# Patient Record
Sex: MALE | Race: WHITE | URBAN - METROPOLITAN AREA
[De-identification: names, ages, dates, MRNs, and addresses within clinical notes are randomized per-mention and may not be internally consistent; named-entity substitution may affect disease eponyms.]

---

## 2023-01-15 ENCOUNTER — APPOINTMENT (OUTPATIENT)
Dept: CT IMAGING | Age: 23
End: 2023-01-15
Payer: COMMERCIAL

## 2023-01-15 ENCOUNTER — HOSPITAL ENCOUNTER (EMERGENCY)
Age: 23
Discharge: HOME | End: 2023-01-15
Attending: EMERGENCY MEDICINE
Payer: COMMERCIAL

## 2023-01-15 VITALS
DIASTOLIC BLOOD PRESSURE: 61 MMHG | SYSTOLIC BLOOD PRESSURE: 127 MMHG | RESPIRATION RATE: 16 BRPM | OXYGEN SATURATION: 98 % | BODY MASS INDEX: 25.05 KG/M2 | HEIGHT: 70 IN | TEMPERATURE: 98.7 F | HEART RATE: 99 BPM | WEIGHT: 175 LBS

## 2023-01-15 DIAGNOSIS — S02.2XXA CLOSED FRACTURE OF NASAL BONE, INITIAL ENCOUNTER: Primary | ICD-10-CM

## 2023-01-15 DIAGNOSIS — S01.21XA LACERATION OF NOSE, INITIAL ENCOUNTER: ICD-10-CM

## 2023-01-15 PROCEDURE — 90714 TD VACC NO PRESV 7 YRS+ IM: CPT | Performed by: STUDENT IN AN ORGANIZED HEALTH CARE EDUCATION/TRAINING PROGRAM

## 2023-01-15 PROCEDURE — 6360000002 HC RX W HCPCS: Performed by: STUDENT IN AN ORGANIZED HEALTH CARE EDUCATION/TRAINING PROGRAM

## 2023-01-15 PROCEDURE — 70486 CT MAXILLOFACIAL W/O DYE: CPT

## 2023-01-15 PROCEDURE — 70450 CT HEAD/BRAIN W/O DYE: CPT

## 2023-01-15 PROCEDURE — 99284 EMERGENCY DEPT VISIT MOD MDM: CPT

## 2023-01-15 PROCEDURE — 90471 IMMUNIZATION ADMIN: CPT | Performed by: STUDENT IN AN ORGANIZED HEALTH CARE EDUCATION/TRAINING PROGRAM

## 2023-01-15 RX ORDER — KETOROLAC TROMETHAMINE 30 MG/ML
15 INJECTION, SOLUTION INTRAMUSCULAR; INTRAVENOUS ONCE
Status: DISCONTINUED | OUTPATIENT
Start: 2023-01-15 | End: 2023-01-15 | Stop reason: HOSPADM

## 2023-01-15 RX ADMIN — CLOSTRIDIUM TETANI TOXOID ANTIGEN (FORMALDEHYDE INACTIVATED) AND CORYNEBACTERIUM DIPHTHERIAE TOXOID ANTIGEN (FORMALDEHYDE INACTIVATED) 0.5 ML: 5; 2 INJECTION, SUSPENSION INTRAMUSCULAR at 05:48

## 2023-01-15 ASSESSMENT — PAIN SCALES - GENERAL: PAINLEVEL_OUTOF10: 0

## 2023-01-15 ASSESSMENT — PAIN - FUNCTIONAL ASSESSMENT: PAIN_FUNCTIONAL_ASSESSMENT: NONE - DENIES PAIN

## 2023-01-15 NOTE — DISCHARGE INSTRUCTIONS
Return if any new or worsening symptoms. Return if any chest pain or shortness of breath. Follow-up with your primary care provider. Do not blow your nose until evaluated by ENT    CT HEAD WO CONTRAST   Final Result   No acute intracranial abnormality. Bilateral nasal bone fractures. Small air-fluid levels within the bilateral maxillary sinuses. No definite   displaced fracture identified. CT FACIAL BONES WO CONTRAST   Final Result   No acute intracranial abnormality. Bilateral nasal bone fractures. Small air-fluid levels within the bilateral maxillary sinuses. No definite   displaced fracture identified.

## 2023-01-15 NOTE — ED PROVIDER NOTES
[address]    Pt Name: Yesenia Dia  MRN: [id number]  [place]gf[address]honatan 2000  Date of evaluation: 1/15/2023  Provider: Allison Cooney MD  PCP: No primary care provider on file. Note Started: 4:28 AM EST 1/15/23    HPI     Patient is a 23 y.o. male presents with a chief complaint of   Chief Complaint   Patient presents with    Laceration     Pt to ED stating he was rough housing with friends and smacked his face on the floor. +laceration to bridge of nose, bleeding controlled. Pt A&Ox4, -LOC. .    Presents after having a laceration on his nose. Patient reportedly was wrestling with a friend. Patient was drinking alcohol today. Patient hit his face on the ground. Patient had some nosebleeding. Did not lose consciousness. Patient does not take any blood thinners. Denies any other injuries. Unsure of his last tetanus. Nursing Notes were all reviewed and agreed with or any disagreements were addressed in the HPI. History From: patient and patient's roommate    Review of Systems   Positives and Pertinent negatives as per HPI. Physical Exam  Vitals and nursing note reviewed. Constitutional:       Appearance: He is well-developed. HENT:      Head: Normocephalic and atraumatic. Eyes:      Conjunctiva/sclera: Conjunctivae normal.   Cardiovascular:      Rate and Rhythm: Normal rate and regular rhythm. Heart sounds: Normal heart sounds. No murmur heard. Pulmonary:      Effort: Pulmonary effort is normal. No respiratory distress. Breath sounds: Normal breath sounds. No wheezing or rales. Abdominal:      General: Bowel sounds are normal.      Palpations: Abdomen is soft. Tenderness: There is no abdominal tenderness. There is no guarding or rebound. Musculoskeletal:         General: No tenderness or deformity. Cervical back: Normal range of motion and neck supple.    Skin:     General: Skin is warm and dry. Comments: Small laceration over the bridge of the nose. Neurological:      Mental Status: He is alert and oriented to person, place, and time. Cranial Nerves: No cranial nerve deficit. Coordination: Coordination normal.        Procedures       MDM           Patient is a 23 y.o. male presenting with facial laceration. Patient had a CT of the head and nose. Patient has no cervical spine tenderness. Patient is neurovascularly intact. No neurodeficits. Patient was drinking alcohol today. Patient had a CT of the head facial bones. Patient has no nasal septal hematoma. No active bleeding. Patient was given a tetanus shot. Patient's laceration was glued shut. Patient clinically appears well. Will be discharged home. Patient does drink alcohol. Patient has a history of a shunt. Shunt looks in place. Patient did have bilateral nasal fractures. Patient was educated not to blow his nose. Patient will follow-up with ear nose and throat. Differential includes but is not limited to laceration, nasal fracture, nasal septal hematoma, intracranial hemorrhage. Patient was given return precautions. Patient will follow up with their primary care provider. Patient is agreeable to this plan. Patient has remained stable throughout their stay in the ED. Patient was seen and evaluated by myself and my attending Nathen Champion MD. Assessment and Plan discussed with attending provider, please see attestation for final plan of care. This note was done using dictation software and there may be some grammatical errors associated with this.       CONSULTS:   None    Medications given in the emergency room:  Medications   ketorolac (TORADOL) injection 15 mg (15 mg IntraMUSCular Not Given 1/15/23 0030)   tetanus & diphtheria toxoids (adult) 5-2 LFU injection 0.5 mL (0.5 mLs IntraMUSCular Given 1/15/23 2735)        LABS:    Labs Reviewed - No data to display    As interpreted by me, the above displayed labs are abnormal. All other labs obtained during this visit were within normal range or not returned as of this dictation. RADIOLOGY:   Non-plain film images such as CT, Ultrasound and MRI are read by the radiologist. Plain radiographic images are visualized and preliminarily interpreted by the ED Provider with the below findings:    CT showed no acute intracranial abnormality. Interpretation per the Radiologist below, if available at the time of this note:    CT HEAD WO CONTRAST   Final Result   No acute intracranial abnormality. Bilateral nasal bone fractures. Small air-fluid levels within the bilateral maxillary sinuses. No definite   displaced fracture identified. CT FACIAL BONES WO CONTRAST   Final Result   No acute intracranial abnormality. Bilateral nasal bone fractures. Small air-fluid levels within the bilateral maxillary sinuses. No definite   displaced fracture identified. No results found. No results found. Lisa Pace MD           --------------------------------------------- PAST HISTORY ---------------------------------------------  Past Medical History:  has no past medical history on file. Past Surgical History:  has no past surgical history on file. Social History:      Family History: family history is not on file. The patients home medications have been reviewed. Allergies: Patient has no known allergies. -------------------------------------------------- RESULTS -------------------------------------------------  Labs:  No results found for this visit on 01/15/23. Radiology:  CT HEAD WO CONTRAST   Final Result   No acute intracranial abnormality. Bilateral nasal bone fractures. Small air-fluid levels within the bilateral maxillary sinuses. No definite   displaced fracture identified. CT FACIAL BONES WO CONTRAST   Final Result   No acute intracranial abnormality. Bilateral nasal bone fractures. Small air-fluid levels within the bilateral maxillary sinuses. No definite   displaced fracture identified.             ------------------------- NURSING NOTES AND VITALS REVIEWED ---------------------------  Date / Time Roomed:  1/15/2023  4:21 AM  ED Bed Assignment:  13/13    The nursing notes within the ED encounter and vital signs as below have been reviewed. /61   Pulse 99   Temp 98.7 °F (37.1 °C) (Oral)   Resp 16   Ht 5' 10\" (1.778 m)   Wt 175 lb (79.4 kg)   SpO2 98%   BMI 25.11 kg/m²   Oxygen Saturation Interpretation: Normal      ------------------------------------------ PROGRESS NOTES ------------------------------------------  4:31 AM EST  I have spoken with the patient and family if present and discussed todays results, in addition to providing specific details for the plan of care and counseling regarding the diagnosis and prognosis. Their questions are answered at this time and they are agreeable with the plan. I discussed at length with them reasons for immediate return here for re evaluation. They will followup with their primary care provider by calling their office as soon as possible.      --------------------------------- ADDITIONAL PROVIDER NOTES ---------------------------------  At this time the patient is without objective evidence of an acute process requiring hospitalization or inpatient management. They have remained hemodynamically stable throughout their entire ED visit and are stable for discharge with outpatient follow-up. The plan has been discussed in detail and they are aware of the specific conditions for emergent return, as well as the importance of follow-up. There are no discharge medications for this patient. Diagnosis:  1. Closed fracture of nasal bone, initial encounter    2.  Laceration of nose, initial encounter        Disposition:  Patient's disposition: Discharge to home  Patient's condition is stable. Rigoberto Souza MD  Resident  01/15/23 5428  ATTENDING PROVIDER ATTESTATION:     I have personally performed and/or participated in the history, exam, medical decision making, and procedures and agree with all pertinent clinical information. I have also reviewed and agree with the past medical, family and social history unless otherwise noted. I have discussed this patient in detail with the resident, and provided the instruction and education regarding facial injury. My findings/Plan: I was the primary provider for patient. Patient presenting here after reportedly falling and hitting his face on the ground. Patient reports he was playfully wrestling with another friend. Patient did not lose consciousness. Patient does report prior history of  shunt when he was a child. Patient arrived here with friend. Patient does admit to drinking some alcohol today. Patient reporting no numbness or tingling or back pain. Patient differential includes facial fracture nasal bone fracture as well as subarachnoid hemorrhage/subdural.  Patient here is awake alert oriented patient heart lung exam normal he has noted swelling to his nose as well as laceration to bridge of nose. Patient has no neck tenderness or back tenderness. Patient abdomen soft and nontender. Moves all extremities. Gait is steady and normal.  Patient while here in the emergency department did undergo CT head as well as CT of facial bones it does show bilateral nasal bone fractures. Patient laceration was repaired with Dermabond. Patient tolerated well vital signs have been stable here. Patient believes his tetanus is up-to-date. Patient present with friend. Patient will be discharged home.        Alis Bowles MD  01/15/23 2024       Alis Bowles MD  01/15/23 [id number]